# Patient Record
Sex: MALE | Race: BLACK OR AFRICAN AMERICAN | NOT HISPANIC OR LATINO | Employment: OTHER | URBAN - METROPOLITAN AREA
[De-identification: names, ages, dates, MRNs, and addresses within clinical notes are randomized per-mention and may not be internally consistent; named-entity substitution may affect disease eponyms.]

---

## 2023-01-24 DIAGNOSIS — K81.9 CHOLECYSTITIS: Primary | ICD-10-CM

## 2023-02-07 ENCOUNTER — OFFICE VISIT (OUTPATIENT)
Dept: SURGERY | Facility: CLINIC | Age: 63
End: 2023-02-07
Payer: MEDICAID

## 2023-02-07 VITALS
RESPIRATION RATE: 20 BRPM | HEART RATE: 87 BPM | SYSTOLIC BLOOD PRESSURE: 127 MMHG | DIASTOLIC BLOOD PRESSURE: 84 MMHG | WEIGHT: 315 LBS | OXYGEN SATURATION: 97 % | BODY MASS INDEX: 37.19 KG/M2 | TEMPERATURE: 98 F | HEIGHT: 77 IN

## 2023-02-07 DIAGNOSIS — K81.9 CHOLECYSTITIS: ICD-10-CM

## 2023-02-07 PROCEDURE — 99213 OFFICE O/P EST LOW 20 MIN: CPT | Mod: PBBFAC

## 2023-02-07 RX ORDER — GLYBURIDE 5 MG/1
10 TABLET ORAL 2 TIMES DAILY
COMMUNITY
Start: 2022-09-21

## 2023-02-07 RX ORDER — AMLODIPINE BESYLATE 10 MG/1
10 TABLET ORAL
COMMUNITY
Start: 2022-11-11

## 2023-02-07 RX ORDER — PRAVASTATIN SODIUM 40 MG/1
40 TABLET ORAL
COMMUNITY
Start: 2022-11-11

## 2023-02-07 RX ORDER — LISINOPRIL AND HYDROCHLOROTHIAZIDE 20; 25 MG/1; MG/1
1 TABLET ORAL
COMMUNITY
Start: 2022-11-11

## 2023-02-07 RX ORDER — HYDROCODONE BITARTRATE AND ACETAMINOPHEN 5; 325 MG/1; MG/1
1 TABLET ORAL EVERY 6 HOURS PRN
COMMUNITY
Start: 2022-11-02

## 2023-02-07 RX ORDER — METOCLOPRAMIDE 10 MG/1
10 TABLET ORAL EVERY 6 HOURS PRN
COMMUNITY
Start: 2022-10-31

## 2023-02-07 RX ORDER — AMPICILLIN 500 MG/1
500 CAPSULE ORAL EVERY 6 HOURS
COMMUNITY
Start: 2022-11-17

## 2023-02-07 RX ORDER — GLUCOSAM/CHON-MSM1/C/MANG/BOSW 500-416.6
TABLET ORAL
COMMUNITY
Start: 2022-12-18

## 2023-02-07 RX ORDER — FOLIC ACID 1 MG/1
1000 TABLET ORAL
COMMUNITY
Start: 2023-01-27

## 2023-02-07 RX ORDER — SEMAGLUTIDE 1.34 MG/ML
0.5 INJECTION, SOLUTION SUBCUTANEOUS
COMMUNITY
Start: 2023-02-01

## 2023-02-07 RX ORDER — DOXYCYCLINE 100 MG/1
100 CAPSULE ORAL EVERY 12 HOURS
COMMUNITY
Start: 2023-01-30

## 2023-02-07 RX ORDER — ATENOLOL 50 MG/1
50 TABLET ORAL
COMMUNITY
Start: 2022-11-11

## 2023-02-07 RX ORDER — METFORMIN HYDROCHLORIDE 500 MG/1
500 TABLET ORAL
COMMUNITY
Start: 2023-01-23

## 2023-02-07 RX ORDER — CALCIUM CITRATE/VITAMIN D3 200MG-6.25
TABLET ORAL 2 TIMES DAILY
COMMUNITY
Start: 2022-12-23

## 2023-02-07 NOTE — PROGRESS NOTES
I have reviewed the notes, assessments, and/or procedures performed by the resident, I concur with her/his documentation of Johnie Garcia.     Soraida Aranda MD

## 2023-02-07 NOTE — PROGRESS NOTES
[facility] Note    CC:   HPI: Johnie Garcia is a 62 y.o.male with HTN, HLD, T2DM (last A1C reported by pt to be 7.0), and a right-sided BKA who presents to clinic today for evaluation for cholecystectomy. He initially presented to an OSH with complaints of nausea, malaise, and decreased appetite. A RUQ US was performed at that time which revealed findings concerning for acute cholecystitis. Patient is unsure if there were stones or not. Laparoscopic cholecystectomy was attempted at that time. Due to adhesions, gallbladder retraction, and poor surgical anatomy the gallbladder was unable to be removed at that time and a PTC was left in place. In the interim, the PTC tube was clamped for a month and eventually fell out on its own. He noted that there was significant drainage from the insertion site, but has since resolved. The PTC has been out for over a month. He says his symptoms have completely resolved since insertion of the PTC tube and has no complaints today. Has never had abdominal surgery besides the attempted cholecystectomy. Does not smoke cigarettes and is not on any blood thinners.     Past Medical History:   Diagnosis Date    Diabetes mellitus, type 2     Hypertension      Past Surgical History:   Procedure Laterality Date    BELOW KNEE AMPUTATION OF LOWER EXTREMITY Right     cholecystostomy       Social History     Socioeconomic History    Marital status:    Tobacco Use    Smoking status: Never    Smokeless tobacco: Never      Family History   Problem Relation Age of Onset    Diabetes Father          ROS: see HPI; otherwise, ROS negative.     Vitals:    02/07/23 1021   BP: 127/84   Pulse: 87   Resp: 20   Temp: 98.2 °F (36.8 °C)     Physical Exam  Gen: NAD, morbidly obese male, poor dentition, sitting in wheelchair  HEENT: NCAT  CV: RRR via peripheral pulses  Resp: no increased work of breathing noted  Abd: nt/nd in all four quadrants; laparscopic port hole sites well healed from  previous surgery    Pertinent Labs  N/A    Pertinent Imaging  N/A    Pertinent Micro/Path/Other  N/A    Assessment/Plan:  Johnie Garcia is a 62 y.o.male with a history of HTN, HLD, well-controlled T2DM, and acute cholecystitis s/p failed laparoscopic cholecystectomy who presents to clinic today for evaluation for repeat cholecystectomy. Pt is currently stable today without any complaints or GI symptoms since his PTC was placed. Given the high risk of complications based on this patient's history, our surgical team agrees that re-attempting the cholecystectomy is not appropriate at this time. Pt was educated that he should return immediately if his prior symptoms begin to reappear.  - Pt educated on risks of procedure given his previous medical history and co-morbidities, and made aware that we will not operate at this time  - Return precautions thoroughly explained to pt  - RTC PRABDULLAHI Hector, MS4  [facility] [employer]  02/07/2023 11:01 AM     Patient seen and note written with assistance from student doctor Tawny. S/p lap cholecystostomy tube placement given dense adhesions acute on chronic cholecystitis at the time of surgery. Unknown if he had cholelithiasis or not. Has been doing well since cholecystostomy removal. Has no symptoms, eats well. Had a long discussion with the patient over the possibility of recurrent cholecystitis, vs the increased risk of operative complications given his surgical history. At this time we feel the risk of operating outweighs the potential benefit as he currently has no symptoms. Had a long discussion with the patient to return to clinic immediately if he develops any abdominal symptoms. RTC PRN    Zeenat Torres MD   [facility] General Surgery PGY5

## 2023-02-07 NOTE — PROGRESS NOTES
[facility] Clinic Note    CC:   HPI: Johnie Garcia is a 62 y.o.male with HTN, HLD, T2DM (last A1C reported by pt to be 7.0), and a right-sided BKA who presents to clinic today for evaluation for cholecystectomy. He initially presented to an OSH with complaints of nausea, malaise, and decreased appetite. A RUQ US was performed at that time which revealed findings concerning for acute cholecystitis. Patient is unsure if there were stones or not. Laparoscopic cholecystectomy was attempted at that time. Due to adhesions, gallbladder retraction, and poor surgical anatomy the gallbladder was unable to be removed at that time and a PTC was left in place. In the interim, the PTC tube was clamped for a month and eventually fell out on its own. He noted that there was significant drainage from the insertion site, but has since resolved. The PTC has been out for over a month. He says his symptoms have completely resolved since insertion of the PTC tube and has no complaints today. Has never had abdominal surgery besides the attempted cholecystectomy. Does not smoke cigarettes and is not on any blood thinners.     Past Medical History:   Diagnosis Date    Diabetes mellitus, type 2     Hypertension      Past Surgical History:   Procedure Laterality Date    BELOW KNEE AMPUTATION OF LOWER EXTREMITY Right     cholecystostomy       Social History     Socioeconomic History    Marital status:    Tobacco Use    Smoking status: Never    Smokeless tobacco: Never      Family History   Problem Relation Age of Onset    Diabetes Father          ROS: see HPI; otherwise, ROS negative.     Vitals:    02/07/23 1021   BP: 127/84   Pulse: 87   Resp: 20   Temp: 98.2 °F (36.8 °C)     Physical Exam  Gen: NAD, morbidly obese male, poor dentition, sitting in wheelchair  HEENT: NCAT  CV: RRR via peripheral pulses  Resp: no increased work of breathing noted  Abd: nt/nd in all four quadrants; laparscopic port hole sites well healed from  previous surgery    Pertinent Labs  N/A    Pertinent Imaging  N/A    Pertinent Micro/Path/Other  N/A    Assessment/Plan:  Johnie Garcia is a 62 y.o.male with a history of HTN, HLD, well-controlled T2DM, and acute cholecystitis s/p failed laparoscopic cholecystectomy who presents to clinic today for evaluation for repeat cholecystectomy. Pt is currently stable today without any complaints or GI symptoms since his PTC was placed. Given the high risk of complications based on this patient's history, our surgical team agrees that re-attempting the cholecystectomy is not appropriate at this time. Pt was educated that he should return immediately if his prior symptoms begin to reappear.  - Pt educated on risks of procedure given his previous medical history and co-morbidities, and made aware that we will not operate at this time  - Return precautions thoroughly explained to pt  - RTC PRABDULLAHI Hector, MS4  [facility] [employer]  02/07/2023 11:01 AM     Patient seen and note written with assistance from student doctor Tawny. S/p lap cholecystostomy tube placement given dense adhesions acute on chronic cholecystitis at the time of surgery. Unknown if he had cholelithiasis or not. Has been doing well since cholecystostomy removal. Has no symptoms, eats well. Had a long discussion with the patient over the possibility of recurrent cholecystitis, vs the increased risk of operative complications given his surgical history. At this time we feel the risk of operating outweighs the potential benefit as he currently has no symptoms. Had a long discussion with the patient to return to clinic immediately if he develops any abdominal symptoms. RTC PRN    Zeenat Torres MD   [facility] General Surgery PGY5